# Patient Record
Sex: MALE | Race: WHITE | NOT HISPANIC OR LATINO
[De-identification: names, ages, dates, MRNs, and addresses within clinical notes are randomized per-mention and may not be internally consistent; named-entity substitution may affect disease eponyms.]

---

## 2021-06-18 PROBLEM — Z00.00 ENCOUNTER FOR PREVENTIVE HEALTH EXAMINATION: Status: ACTIVE | Noted: 2021-06-18

## 2021-07-02 ENCOUNTER — APPOINTMENT (OUTPATIENT)
Dept: OTOLARYNGOLOGY | Facility: CLINIC | Age: 71
End: 2021-07-02
Payer: MEDICARE

## 2021-07-02 VITALS
TEMPERATURE: 98.4 F | RESPIRATION RATE: 14 BRPM | HEIGHT: 69 IN | BODY MASS INDEX: 23.7 KG/M2 | DIASTOLIC BLOOD PRESSURE: 79 MMHG | WEIGHT: 160 LBS | HEART RATE: 78 BPM | SYSTOLIC BLOOD PRESSURE: 132 MMHG

## 2021-07-02 PROCEDURE — 99204 OFFICE O/P NEW MOD 45 MIN: CPT | Mod: 25

## 2021-07-02 PROCEDURE — 92567 TYMPANOMETRY: CPT

## 2021-07-02 PROCEDURE — 92557 COMPREHENSIVE HEARING TEST: CPT

## 2021-07-02 PROCEDURE — 92504 EAR MICROSCOPY EXAMINATION: CPT

## 2021-07-02 RX ORDER — IMATINIB MESYLATE 100 MG
CAPSULE ORAL
Refills: 0 | Status: ACTIVE | COMMUNITY

## 2021-07-03 NOTE — CONSULT LETTER
[Dear  ___] : Dear  [unfilled], [FreeTextEntry2] : Shoaib Davies MD [FreeTextEntry1] : Dear Shoaib,\par \par Thanks for referring Armando Goddard for evaluation of his right ear canal lesion.  As you know, he is a pleasant 70-year-old gentleman with a right ear canal lesion.  He develops pain with attempted ear cleaning.  He has a history of leukemia but is in remission.  On otoscopic exam today, the left ear canal and tympanic membrane appear normal.  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  Medially the tympanic membrane appears intact without effusion or retraction.  I personally ordered and reviewed an audiogram for the patient's hearing loss, which shows bilateral symmetric downsloping sensorineural hearing loss. I also personally reviewed the CAT scan images and the report, which show soft tissue opacification with minor bone erosion involving the anterior inferior bony ear canal.\par \par I very much agree that his clinical exam and CT findings appear consistent with right ear canal cholesteatoma.  He was unable to tolerate attempted in office cleaning today.  I am not also optimistic that in-office cleaning alone would lead to resolution of the cholesteatoma, and given the presence of exposed bone and debris accumulation, I recommended bony canalplasty under anesthesia for cholesteatoma removal.  He would prefer to continue observation in the short-term, and given these are generally slowly progressive abnormalities, it is reasonable to continue observation in the short-term.  I did however  him that cholesteatomas generally do not resolve on their own, and he is likely to require a procedure for durable resolution.  For now i will reexamine the ear in 3 months.\par \par Thank you once again for the opportunity to participate in your patient's care, and I will keep you informed as to his progress.\par \par Best regards,\par \par Kolton Batista MD\par Otology/Neurotology\par [facility]\par [facility]

## 2021-07-03 NOTE — CONSULT LETTER
[Dear  ___] : Dear  [unfilled], [FreeTextEntry2] : Shoaib Davies MD [FreeTextEntry1] : Dear Shoaib,\par \par Thanks for referring Armando Goddard for evaluation of his right ear canal lesion.  As you know, he is a pleasant 70-year-old gentleman with a right ear canal lesion.  He develops pain with attempted ear cleaning.  He has a history of leukemia but is in remission.  On otoscopic exam today, the left ear canal and tympanic membrane appear normal.  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  Medially the tympanic membrane appears intact without effusion or retraction.  I personally ordered and reviewed an audiogram for the patient's hearing loss, which shows bilateral symmetric downsloping sensorineural hearing loss. I also personally reviewed the CAT scan images and the report, which show soft tissue opacification with minor bone erosion involving the anterior inferior bony ear canal.\par \par I very much agree that his clinical exam and CT findings appear consistent with right ear canal cholesteatoma.  He was unable to tolerate attempted in office cleaning today.  I am not also optimistic that in-office cleaning alone would lead to resolution of the cholesteatoma, and given the presence of exposed bone and debris accumulation, I recommended bony canalplasty under anesthesia for cholesteatoma removal.  He would prefer to continue observation in the short-term, and given these are generally slowly progressive abnormalities, it is reasonable to continue observation in the short-term.  I did however  him that cholesteatomas generally do not resolve on their own, and he is likely to require a procedure for durable resolution.  For now i will reexamine the ear in 3 months.\par \par Thank you once again for the opportunity to participate in your patient's care, and I will keep you informed as to his progress.\par \par Best regards,\par \par Kolton Batista MD\par Otology/Neurotology\par [facility]\par [facility]

## 2021-07-23 ENCOUNTER — APPOINTMENT (OUTPATIENT)
Dept: ORTHOPEDIC SURGERY | Facility: CLINIC | Age: 71
End: 2021-07-23
Payer: MEDICARE

## 2021-07-23 VITALS
DIASTOLIC BLOOD PRESSURE: 74 MMHG | HEART RATE: 76 BPM | HEIGHT: 69 IN | WEIGHT: 160 LBS | SYSTOLIC BLOOD PRESSURE: 130 MMHG | BODY MASS INDEX: 23.7 KG/M2

## 2021-07-23 DIAGNOSIS — M77.8 OTHER ENTHESOPATHIES, NOT ELSEWHERE CLASSIFIED: ICD-10-CM

## 2021-07-23 DIAGNOSIS — M65.342 TRIGGER FINGER, LEFT RING FINGER: ICD-10-CM

## 2021-07-23 DIAGNOSIS — M65.331 TRIGGER FINGER, RIGHT MIDDLE FINGER: ICD-10-CM

## 2021-07-23 DIAGNOSIS — M65.352 TRIGGER FINGER, LEFT LITTLE FINGER: ICD-10-CM

## 2021-07-23 PROCEDURE — 99203 OFFICE O/P NEW LOW 30 MIN: CPT

## 2021-07-23 PROCEDURE — 73110 X-RAY EXAM OF WRIST: CPT | Mod: 50

## 2021-07-23 PROCEDURE — 73130 X-RAY EXAM OF HAND: CPT | Mod: 50

## 2021-07-23 RX ORDER — DOCUSATE SODIUM 100 MG/1
TABLET ORAL
Refills: 0 | Status: ACTIVE | COMMUNITY

## 2021-07-23 RX ORDER — PNV NO.95/FERROUS FUM/FOLIC AC 28MG-0.8MG
TABLET ORAL
Refills: 0 | Status: ACTIVE | COMMUNITY

## 2021-07-23 NOTE — DISCUSSION/SUMMARY
[FreeTextEntry1] : He has findings consistent with multiple hand chronic bilateral trigger fingers, most symptomatic at the left little and ring fingers.\par \par I had a discussion with the patient regarding today's visit, the prognosis of this diagnosis, and treatment recommendations and options. At this time, we discussed cortisone injections into the left little and ring fingers and details of surgical release. He deferred the cortisone injections today.  He would like to consider his options.  If he decides to proceed with cortisone injections or surgery, he will return to the office to discuss this further.\par \par He has agreed to the above plan of management and has expressed full understanding.  All questions were fully answered to their satisfaction. \par \par My cumulative time spent on this visit included: Preparation for the visit, review of the medical records, review of pertinent diagnostic studies, examination and counseling of the patient on the above diagnosis, treatment plan and prognosis, orders of diagnostic tests, medication and/or appropriate procedures and documentation in the medical records of today's visit.

## 2021-07-23 NOTE — ADDENDUM
[FreeTextEntry1] : I, Shanika Chapa, acted solely as a scribe for Dr. Jeff on this date on 07/23/2021.

## 2021-07-23 NOTE — HISTORY OF PRESENT ILLNESS
[Right] : right hand dominant [FreeTextEntry1] : He comes in today for evaluation of bilateral hand pain which began 17 years ago. The left hand is worse than the right hand. He denies injury of antecedent event. He denies numbness and tingling. He previously was treated by a doctor many years ago, but had no definitive diagnosis. He rates his right hand pain as a 10 out of 10 and he rates his left hand pain as a 10 out of 10.\par \par Patient has his own business, he works in security systems. He states as of next week he will be starting a new job.\par \par Of note, he has a history of Leukemia and prostate cancer.  He is on Gleevec for the leukemia.  He was treated with radiation for prostate cancer.

## 2021-07-23 NOTE — PHYSICAL EXAM
[de-identified] : - Constitutional: This is a male in no obvious distress.  \par - Psych: Patient is alert and oriented to person, place and time.  Patient has a normal mood and affect.\par - Cardiovascular: Normal pulses throughout the upper extremities.  No significant varicosities are noted in the upper extremities. \par - Neuro: Strength and sensation are intact throughout the upper extremities.  Patient has normal coordination.\par - Respiratory:  Patient exhibits no evidence of shortness of breath or difficulty breathing.\par - Skin: No rashes, lesions, or other abnormalities are noted in the upper extremities.\par \par --- \par \par Examination of his left hand demonstrates no focal swelling.  He has limitation of flexion and extension of the digits.  He has tenderness along the A1 pulleys of the left little and ring fingers with triggering.  He has tenderness along the left middle and index fingers without obvious triggering.  There is mild tenderness along the A1 pulley of the left thumb without triggering.  Provocative signs for carpal tunnel syndrome are negative.  He is neurovascularly intact distally.\par \par Examination of right hand demonstrates no obvious swelling.  He has mild tenderness along the A1 pulleys of the index or little fingers with triggering of the right middle finger.  There is no triggering of the other digits.  There is no triggering of the thumb.  Provocative signs for carpal tunnel syndrome are negative.  He is neurovascularly intact distally. [de-identified] : PA, lateral, and oblique radiographs of the bilateral wrists and hands demonstrate minimal narrowing of both thumb CMC joints.  There is no evidence of appreciable arthritis in the digits.

## 2021-07-23 NOTE — END OF VISIT
[FreeTextEntry3] : This note was written by Shanika Chapa on 07/23/2021 acting solely as a scribe for Dr. Julio Cesar Jeff.\par  \par All medical record entries made by the Scribe were at my, Dr. Julio Cesar Jeff, direction and personally dictated by me on 07/23/2021. I have personally reviewed the chart and agree that the record accurately reflects my personal performance of the history, physical exam, assessment and plan.

## 2021-08-20 ENCOUNTER — APPOINTMENT (OUTPATIENT)
Dept: OTOLARYNGOLOGY | Facility: CLINIC | Age: 71
End: 2021-08-20

## 2021-09-27 NOTE — DATA REVIEWED
[de-identified] : Hearing WNL sloping to a severe high frequency SNHL bilaterally.\par Type A tympanograms bilaterally.

## 2021-09-27 NOTE — PHYSICAL EXAM
[Normal] : external ears are normal bilaterally [de-identified] : AD: canal cholesteatoma, AS: cerumen

## 2021-09-27 NOTE — PHYSICAL EXAM
[Normal] : external ears are normal bilaterally [de-identified] : AD: canal cholesteatoma, AS: cerumen

## 2021-09-27 NOTE — DATA REVIEWED
[de-identified] : Hearing WNL sloping to a severe high frequency SNHL bilaterally.\par Type A tympanograms bilaterally.

## 2021-09-27 NOTE — PROCEDURE
[FreeTextEntry3] : Procedure note:  Left cerumenectomy\par \par Jul 02, 2021 \par \par Description of Procedure:  Cerumen impaction was noted requiring debridement under the operating microscope using otologic instrumentation.  The patient tolerated the procedure without complications.\par \par

## 2021-09-27 NOTE — HISTORY OF PRESENT ILLNESS
[de-identified] : 70M referred by Dr. Davies for Right ear canal lesion, and otalgia\par CT done at [facility]\par denies discharge today\par Hx of three right ear infections in the last four years\par treated with abx, had resolved, now with lesion\par no ear infections or surgery previous to this \par denies perceived changes in his hearing\par denies headaches or dizziness

## 2021-09-27 NOTE — HISTORY OF PRESENT ILLNESS
[de-identified] : 70M referred by Dr. Davies for Right ear canal lesion, and otalgia\par CT done at [facility]\par denies discharge today\par Hx of three right ear infections in the last four years\par treated with abx, had resolved, now with lesion\par no ear infections or surgery previous to this \par denies perceived changes in his hearing\par denies headaches or dizziness

## 2021-10-07 ENCOUNTER — APPOINTMENT (OUTPATIENT)
Dept: OTOLARYNGOLOGY | Facility: CLINIC | Age: 71
End: 2021-10-07
Payer: MEDICARE

## 2021-10-07 VITALS
BODY MASS INDEX: 23.7 KG/M2 | SYSTOLIC BLOOD PRESSURE: 144 MMHG | HEIGHT: 69 IN | DIASTOLIC BLOOD PRESSURE: 83 MMHG | WEIGHT: 160 LBS | HEART RATE: 73 BPM

## 2021-10-07 PROCEDURE — 69210 REMOVE IMPACTED EAR WAX UNI: CPT

## 2021-10-07 PROCEDURE — 99214 OFFICE O/P EST MOD 30 MIN: CPT | Mod: 25

## 2021-10-07 RX ORDER — PANTOPRAZOLE SODIUM 40 MG/1
40 TABLET, DELAYED RELEASE ORAL
Refills: 0 | Status: DISCONTINUED | COMMUNITY
End: 2021-10-07

## 2021-10-07 RX ORDER — ESOMEPRAZOLE MAGNESIUM 40 MG/1
CAPSULE, DELAYED RELEASE ORAL
Refills: 0 | Status: ACTIVE | COMMUNITY

## 2021-10-07 NOTE — HISTORY OF PRESENT ILLNESS
[de-identified] : 70 year male follow-up for right ear lesion\par One month ago experienced left ear otalgia that lasted for two or three days and went away naturally\par Patient currently denies otalgia, otorrhea, ear infections since the last visit, changes to hearing, tinnitus, dizziness, headaches related to hearing.

## 2021-10-07 NOTE — REASON FOR VISIT
[Subsequent Evaluation] : a subsequent evaluation for [FreeTextEntry2] : follow-up for right ear lesion.

## 2021-10-07 NOTE — CONSULT LETTER
[FreeTextEntry2] : Shoaib Daives MD [FreeTextEntry1] : Dear Shoaib,\par \par Armando Goddard presents for follow-up for his right ear canal cholesteatoma.  Since his last visit, he reports no new ear pain or drainage.  Exam today again shows ceruminous debris accumulation in the inferior right ear canal consistent with an ear canal cholesteatoma.  He once again was unable to tolerate in office cleaning aside from removal of superficial wax accumulation in either ear.  Because of his inability to tolerate in office cleaning and the persistence of the bony canal abnormality, I again recommended exam under anesthesia and cholesteatoma removal.  I would also plan to perform limited bony canalplasty through a transcanal approach should this be necessary, and I discussed this with him and his family member in the office today.  He agrees to proceed and this will be set up in the next 1 to 2 months for scheduling.\par \par Thank you once again for the opportunity to participate in your patient's care, and I will keep you informed as to his progress.\par \par Best regards,\par \par Kolton Batista MD\par Otology/Neurotology\par [facility]\par [facility]

## 2022-04-16 ENCOUNTER — TRANSCRIPTION ENCOUNTER (OUTPATIENT)
Age: 72
End: 2022-04-16

## 2022-04-20 ENCOUNTER — OUTPATIENT (OUTPATIENT)
Dept: OUTPATIENT SERVICES | Facility: HOSPITAL | Age: 72
LOS: 1 days | End: 2022-04-20
Payer: MEDICARE

## 2022-04-20 VITALS
SYSTOLIC BLOOD PRESSURE: 144 MMHG | DIASTOLIC BLOOD PRESSURE: 78 MMHG | HEIGHT: 69 IN | OXYGEN SATURATION: 99 % | WEIGHT: 160.06 LBS | RESPIRATION RATE: 16 BRPM | TEMPERATURE: 97 F | HEART RATE: 78 BPM

## 2022-04-20 DIAGNOSIS — H60.41 CHOLESTEATOMA OF RIGHT EXTERNAL EAR: ICD-10-CM

## 2022-04-20 DIAGNOSIS — H90.41 SENSORINEURAL HEARING LOSS, UNILATERAL, RIGHT EAR, WITH UNRESTRICTED HEARING ON THE CONTRALATERAL SIDE: ICD-10-CM

## 2022-04-20 DIAGNOSIS — Z98.890 OTHER SPECIFIED POSTPROCEDURAL STATES: Chronic | ICD-10-CM

## 2022-04-20 DIAGNOSIS — K21.9 GASTRO-ESOPHAGEAL REFLUX DISEASE WITHOUT ESOPHAGITIS: Chronic | ICD-10-CM

## 2022-04-20 DIAGNOSIS — H90.5 UNSPECIFIED SENSORINEURAL HEARING LOSS: ICD-10-CM

## 2022-04-20 DIAGNOSIS — H93.293 OTHER ABNORMAL AUDITORY PERCEPTIONS, BILATERAL: ICD-10-CM

## 2022-04-20 DIAGNOSIS — Z87.19 PERSONAL HISTORY OF OTHER DISEASES OF THE DIGESTIVE SYSTEM: Chronic | ICD-10-CM

## 2022-04-20 LAB
ALBUMIN SERPL ELPH-MCNC: 4.7 G/DL — SIGNIFICANT CHANGE UP (ref 3.3–5)
ALP SERPL-CCNC: 80 U/L — SIGNIFICANT CHANGE UP (ref 40–120)
ALT FLD-CCNC: 14 U/L — SIGNIFICANT CHANGE UP (ref 4–41)
ANION GAP SERPL CALC-SCNC: 13 MMOL/L — SIGNIFICANT CHANGE UP (ref 7–14)
AST SERPL-CCNC: 29 U/L — SIGNIFICANT CHANGE UP (ref 4–40)
BILIRUB SERPL-MCNC: 0.3 MG/DL — SIGNIFICANT CHANGE UP (ref 0.2–1.2)
BUN SERPL-MCNC: 19 MG/DL — SIGNIFICANT CHANGE UP (ref 7–23)
CALCIUM SERPL-MCNC: 9.3 MG/DL — SIGNIFICANT CHANGE UP (ref 8.4–10.5)
CHLORIDE SERPL-SCNC: 101 MMOL/L — SIGNIFICANT CHANGE UP (ref 98–107)
CO2 SERPL-SCNC: 24 MMOL/L — SIGNIFICANT CHANGE UP (ref 22–31)
CREAT SERPL-MCNC: 1.37 MG/DL — HIGH (ref 0.5–1.3)
EGFR: 56 ML/MIN/1.73M2 — LOW
GLUCOSE SERPL-MCNC: 106 MG/DL — HIGH (ref 70–99)
HCT VFR BLD CALC: 38.9 % — LOW (ref 39–50)
HGB BLD-MCNC: 12.5 G/DL — LOW (ref 13–17)
MCHC RBC-ENTMCNC: 30.6 PG — SIGNIFICANT CHANGE UP (ref 27–34)
MCHC RBC-ENTMCNC: 32.1 GM/DL — SIGNIFICANT CHANGE UP (ref 32–36)
MCV RBC AUTO: 95.1 FL — SIGNIFICANT CHANGE UP (ref 80–100)
NRBC # BLD: 0 /100 WBCS — SIGNIFICANT CHANGE UP
NRBC # FLD: 0 K/UL — SIGNIFICANT CHANGE UP
PLATELET # BLD AUTO: 238 K/UL — SIGNIFICANT CHANGE UP (ref 150–400)
POTASSIUM SERPL-MCNC: 4.4 MMOL/L — SIGNIFICANT CHANGE UP (ref 3.5–5.3)
POTASSIUM SERPL-SCNC: 4.4 MMOL/L — SIGNIFICANT CHANGE UP (ref 3.5–5.3)
PROT SERPL-MCNC: 7.3 G/DL — SIGNIFICANT CHANGE UP (ref 6–8.3)
RBC # BLD: 4.09 M/UL — LOW (ref 4.2–5.8)
RBC # FLD: 13.5 % — SIGNIFICANT CHANGE UP (ref 10.3–14.5)
SODIUM SERPL-SCNC: 138 MMOL/L — SIGNIFICANT CHANGE UP (ref 135–145)
WBC # BLD: 6.77 K/UL — SIGNIFICANT CHANGE UP (ref 3.8–10.5)
WBC # FLD AUTO: 6.77 K/UL — SIGNIFICANT CHANGE UP (ref 3.8–10.5)

## 2022-04-20 PROCEDURE — 93010 ELECTROCARDIOGRAM REPORT: CPT

## 2022-04-20 NOTE — H&P PST ADULT - WEIGHT IN KG
What Type Of Note Output Would You Prefer (Optional)?: Standard Output How Severe Are Your Spot(S)?: mild Have Your Spot(S) Been Treated In The Past?: has not been treated Hpi Title: Evaluation of Skin Lesions 72.6

## 2022-04-20 NOTE — H&P PST ADULT - PROBLEM SELECTOR PLAN 1
Patient tentatively scheduled for  right ear canal biopsy ,right tympanoplasty ,fat/fascia graft on 04/26/2022  Pre-op instructions provided. Pt given verbal and written instructions with teach back on pantoprazole . Pt verbalized understanding with return demonstration.   Pt strongly advised to follow up with surgeon to discuss COVID testing requirements prior to procedure.

## 2022-04-20 NOTE — H&P PST ADULT - ASSESSMENT
pre op dx of other abnormal auditory perceptions bilateral is scheduled for right ear canal biopsy ,right tympanoplasty ,fat/fascia graft   Other abnormal auditory perceptions bilateral

## 2022-04-20 NOTE — H&P PST ADULT - NSICDXPASTSURGICALHX_GEN_ALL_CORE_FT
PAST SURGICAL HISTORY:  GERD (gastroesophageal reflux disease)     H/O constipation     H/O hernia repair 2019    H/O rotator cuff surgery right

## 2022-04-25 ENCOUNTER — TRANSCRIPTION ENCOUNTER (OUTPATIENT)
Age: 72
End: 2022-04-25

## 2022-04-26 ENCOUNTER — APPOINTMENT (OUTPATIENT)
Dept: OTOLARYNGOLOGY | Facility: AMBULATORY SURGERY CENTER | Age: 72
End: 2022-04-26

## 2022-04-26 ENCOUNTER — OUTPATIENT (OUTPATIENT)
Dept: OUTPATIENT SERVICES | Facility: HOSPITAL | Age: 72
LOS: 1 days | Discharge: HOME | End: 2022-04-26
Payer: MEDICARE

## 2022-04-26 ENCOUNTER — TRANSCRIPTION ENCOUNTER (OUTPATIENT)
Age: 72
End: 2022-04-26

## 2022-04-26 ENCOUNTER — RESULT REVIEW (OUTPATIENT)
Age: 72
End: 2022-04-26

## 2022-04-26 VITALS
DIASTOLIC BLOOD PRESSURE: 70 MMHG | WEIGHT: 160.06 LBS | HEART RATE: 75 BPM | TEMPERATURE: 98 F | SYSTOLIC BLOOD PRESSURE: 180 MMHG | OXYGEN SATURATION: 99 % | RESPIRATION RATE: 16 BRPM | HEIGHT: 69 IN

## 2022-04-26 VITALS
HEART RATE: 79 BPM | RESPIRATION RATE: 18 BRPM | DIASTOLIC BLOOD PRESSURE: 70 MMHG | OXYGEN SATURATION: 96 % | SYSTOLIC BLOOD PRESSURE: 127 MMHG

## 2022-04-26 DIAGNOSIS — K21.9 GASTRO-ESOPHAGEAL REFLUX DISEASE WITHOUT ESOPHAGITIS: Chronic | ICD-10-CM

## 2022-04-26 DIAGNOSIS — Z87.19 PERSONAL HISTORY OF OTHER DISEASES OF THE DIGESTIVE SYSTEM: Chronic | ICD-10-CM

## 2022-04-26 DIAGNOSIS — H60.41 CHOLESTEATOMA OF RIGHT EXTERNAL EAR: ICD-10-CM

## 2022-04-26 DIAGNOSIS — Z98.890 OTHER SPECIFIED POSTPROCEDURAL STATES: Chronic | ICD-10-CM

## 2022-04-26 PROBLEM — C61 MALIGNANT NEOPLASM OF PROSTATE: Chronic | Status: ACTIVE | Noted: 2022-04-20

## 2022-04-26 PROBLEM — C95.90 LEUKEMIA, UNSPECIFIED NOT HAVING ACHIEVED REMISSION: Chronic | Status: ACTIVE | Noted: 2022-04-20

## 2022-04-26 PROCEDURE — 69145 REMOVE EAR CANAL LESION(S): CPT

## 2022-04-26 PROCEDURE — 88305 TISSUE EXAM BY PATHOLOGIST: CPT | Mod: 26

## 2022-04-26 DEVICE — SURGIFOAM PAD 8CM X 12.5CM X 2MM (100C): Type: IMPLANTABLE DEVICE | Status: FUNCTIONAL

## 2022-04-26 RX ORDER — IMATINIB MESYLATE 400 MG/1
1 TABLET, FILM COATED ORAL
Qty: 0 | Refills: 0 | DISCHARGE

## 2022-04-26 RX ORDER — PREGABALIN 225 MG/1
1 CAPSULE ORAL
Qty: 0 | Refills: 0 | DISCHARGE

## 2022-04-26 RX ORDER — ACETAMINOPHEN 500 MG
2 TABLET ORAL
Qty: 60 | Refills: 0
Start: 2022-04-26

## 2022-04-26 RX ORDER — PANTOPRAZOLE SODIUM 20 MG/1
1 TABLET, DELAYED RELEASE ORAL
Qty: 0 | Refills: 0 | DISCHARGE

## 2022-04-26 RX ORDER — DOCUSATE SODIUM 100 MG
1 CAPSULE ORAL
Qty: 0 | Refills: 0 | DISCHARGE

## 2022-04-26 RX ORDER — CIPROFLOXACIN AND DEXAMETHASONE 3; 1 MG/ML; MG/ML
2 SUSPENSION/ DROPS AURICULAR (OTIC)
Qty: 7.5 | Refills: 0
Start: 2022-04-26

## 2022-04-26 NOTE — ASU DISCHARGE PLAN (ADULT/PEDIATRIC) - ASU DC SPECIAL INSTRUCTIONSFT
refer to postop ear surgery instructions handout -ciprodex drops to right ear 2 time a day until follow up    -should expect minimal pain. You may take tylenol every 4-6 hours as needed    -continue following dry ear precaution, especially when showering or bathing. Place a cotton ball coated with vaseline on it into the ear to protect the inside of the right ear getting wet

## 2022-04-26 NOTE — ASU DISCHARGE PLAN (ADULT/PEDIATRIC) - CARE PROVIDER_API CALL
Reji Batista)  Neurotology; Otolaryngology  [address] [place] [address]  Phone: [phone]  Fax: [phone]  Follow Up Time:

## 2022-04-26 NOTE — ASU DISCHARGE PLAN (ADULT/PEDIATRIC) - PROVIDER TOKENS
Quality 130: Documentation Of Current Medications In The Medical Record: Current Medications Documented
Detail Level: Detailed
PROVIDER:[TOKEN:[[id number]]]

## 2022-04-26 NOTE — BRIEF OPERATIVE NOTE - OPERATION/FINDINGS
s/p transcanal canalplasty, right external ear canal cholesteatoma removal s/p transcanal right external ear canal cholesteatoma removal

## 2022-05-05 LAB — SURGICAL PATHOLOGY STUDY: SIGNIFICANT CHANGE UP

## 2022-05-12 ENCOUNTER — APPOINTMENT (OUTPATIENT)
Dept: OTOLARYNGOLOGY | Facility: CLINIC | Age: 72
End: 2022-05-12
Payer: MEDICARE

## 2022-05-12 VITALS
BODY MASS INDEX: 23.7 KG/M2 | DIASTOLIC BLOOD PRESSURE: 84 MMHG | SYSTOLIC BLOOD PRESSURE: 144 MMHG | HEIGHT: 69 IN | WEIGHT: 160 LBS | HEART RATE: 76 BPM

## 2022-05-12 PROCEDURE — 99024 POSTOP FOLLOW-UP VISIT: CPT

## 2022-05-12 NOTE — HISTORY OF PRESENT ILLNESS
[de-identified] : 71 year old male presents for follow up Excision of right external auditory canal soft tissue lesion/cholesteatoma,  use of operative microscope 4/26/22. States doing well after surgery has no complains. Denies fevers, bleeding, otorrhea or otalgia. \par \par Pathology 4/26/22\par Final Diagnosis\par 1. Ear, canal mass, right, excision\par -  Keratin debris consistent with cholesteatoma\par 2. Ear, external auditory canal, right, biopsy

## 2022-05-12 NOTE — REASON FOR VISIT
[Subsequent Evaluation] : a subsequent evaluation for [FreeTextEntry2] : s/p Excision of right external auditory canal soft tissue lesion/cholesteatoma, use of operative microscope  4/26/22

## 2022-05-12 NOTE — CONSULT LETTER
[FreeTextEntry2] : Shoaib Davies MD [FreeTextEntry1] : Dear Shoaib,\par \par Armando Goddard presents for follow-up for his right ear canal cholesteatoma removal.  He is now 2 weeks status post canalplasty.  At the time of the procedure, we found a small focus of necrotic bone combined with cholesteatoma debris in the ear canal that was removed in entirety through transcanal approach.  Since surgery he has been doing well without significant pain, drainage or dizziness.  Exam today shows minor dry debris along the anterior ear canal but no evidence of recurrent cholesteatoma.  I asked that he continue drops for another week, maintain dry ear precautions, and we will see him back in 6 weeks.\par \par Thank you once again for the opportunity to participate in your patient's care, and I will keep you informed as to his progress.\par \par Best regards,\par \par Kolton Batista MD\par Otology/Neurotology\par [facility]\par [facility]

## 2022-05-12 NOTE — ASSESSMENT
[FreeTextEntry1] : Armando Goddard presents for follow-up for his right ear canal cholesteatoma removal.  He is now 2 weeks status post canalplasty.  At the time of the procedure, we found a small focus of necrotic bone combined with cholesteatoma debris in the ear canal that was removed in entirety through transcanal approach.  Since surgery he has been doing well without significant pain, drainage or dizziness.  Exam today shows minor dry debris along the anterior ear canal but no evidence of recurrent cholesteatoma.  Bilateral facial nerve function is normal, and the right tympanic membrane is intact without effusion or retraction.  I asked that he continue drops for another week, maintain dry ear precautions, and we will see him back in 6 weeks.

## 2022-06-22 ENCOUNTER — APPOINTMENT (OUTPATIENT)
Dept: OTOLARYNGOLOGY | Facility: CLINIC | Age: 72
End: 2022-06-22
Payer: MEDICARE

## 2022-06-22 VITALS
HEART RATE: 67 BPM | DIASTOLIC BLOOD PRESSURE: 83 MMHG | WEIGHT: 160 LBS | HEIGHT: 69 IN | TEMPERATURE: 97.6 F | BODY MASS INDEX: 23.7 KG/M2 | SYSTOLIC BLOOD PRESSURE: 131 MMHG

## 2022-06-22 PROCEDURE — 99024 POSTOP FOLLOW-UP VISIT: CPT

## 2022-06-22 NOTE — ASSESSMENT
[FreeTextEntry1] : Presents for second postoperative visit status post right EAC cholesteatoma removal.  Exam today shows well-healed right ear canal epithelium without exposed bone or recurrent cholesteatoma.  Cerumen removed from left ear.  Maintain dry ear precautions, follow-up 3 months.

## 2022-06-22 NOTE — HISTORY OF PRESENT ILLNESS
[de-identified] : 70 y/o M presents for post operative visit. S/P excision of right external auditory canal cholesteatoma 4/26/22. Reports chronic tinnitus; no changes since last visit. Denies any post surgical complications. Patient denies otalgia, otorrhea, hearing loss, dizziness, vertigo, headaches related to hearing.

## 2022-12-21 ENCOUNTER — APPOINTMENT (OUTPATIENT)
Dept: OTOLARYNGOLOGY | Facility: CLINIC | Age: 72
End: 2022-12-21

## 2022-12-21 DIAGNOSIS — H60.41 CHOLESTEATOMA OF RIGHT EXTERNAL EAR: ICD-10-CM

## 2022-12-21 PROCEDURE — 99213 OFFICE O/P EST LOW 20 MIN: CPT | Mod: 25

## 2022-12-21 PROCEDURE — 69210 REMOVE IMPACTED EAR WAX UNI: CPT

## 2022-12-21 NOTE — PROCEDURE
[FreeTextEntry3] : Procedure note:  Bilateral cerumenectomy\par \par Dec 21, 2022 \par \par Description of Procedure:   Bilateral cerumen impactions were noted requiring debridement under the operating microscope using otologic instrumentation.  The patient tolerated the procedure without complications.

## 2022-12-21 NOTE — ASSESSMENT
[FreeTextEntry1] : Patient presents for follow-up for right ear canal cholesteatoma.  Reports no pain or drainage since last visit.  Reports continued improved hearing since last visit.  Otoscopic exam today shows well-healed right ear canal epithelium without recurrent cholesteatoma.  Minor cerumen accumulation removed bilaterally.\par \par No longer needs to maintain dry ear precautions.  Follow-up annually to monitor hearing, avoid Q-tip usage.

## 2022-12-21 NOTE — PHYSICAL EXAM
[Binocular Microscopic Exam] : Binocular microscopic exam was performed [Normal] : the left external ear was normal [de-identified] : wax accumulation; skin healed anterior canal wall no sign of regrowth [de-identified] : wax accumulation

## 2022-12-21 NOTE — HISTORY OF PRESENT ILLNESS
[de-identified] : 71 y/o M presents for follow-up\par s/p right EAC cholesteatoma removal 4/26/22\par denies any new issues with pain or drainage

## 2022-12-30 ENCOUNTER — APPOINTMENT (OUTPATIENT)
Dept: OTOLARYNGOLOGY | Facility: CLINIC | Age: 72
End: 2022-12-30

## 2023-04-15 NOTE — H&P PST ADULT - HISTORY OF PRESENT ILLNESS
15-Apr-2023 22:57
71 year old male with pre op dx of other abnormal auditory perceptions bilateral is scheduled for right ear canal biopsy ,right tympanoplasty ,fat/fascia graft

## 2023-11-09 NOTE — REASON FOR VISIT
[Initial Consultation] : an initial consultation for [FreeTextEntry2] : right ear lesion Dressing: no dressing applied

## 2023-12-15 ENCOUNTER — APPOINTMENT (OUTPATIENT)
Dept: OTOLARYNGOLOGY | Facility: CLINIC | Age: 73
End: 2023-12-15
Payer: MEDICARE

## 2023-12-15 DIAGNOSIS — H93.293 OTHER ABNORMAL AUDITORY PERCEPTIONS, BILATERAL: ICD-10-CM

## 2023-12-15 DIAGNOSIS — H61.23 IMPACTED CERUMEN, BILATERAL: ICD-10-CM

## 2023-12-15 DIAGNOSIS — H90.5 UNSPECIFIED SENSORINEURAL HEARING LOSS: ICD-10-CM

## 2023-12-15 PROCEDURE — 92557 COMPREHENSIVE HEARING TEST: CPT

## 2023-12-15 PROCEDURE — 92567 TYMPANOMETRY: CPT

## 2023-12-15 PROCEDURE — 99213 OFFICE O/P EST LOW 20 MIN: CPT | Mod: 25

## 2023-12-15 PROCEDURE — 69210 REMOVE IMPACTED EAR WAX UNI: CPT

## 2023-12-15 NOTE — PROCEDURE
[FreeTextEntry3] : Procedure note:  Bilateral cerumenectomy  Description of Procedure:  Cerumen impaction was noted requiring debridement under the operating microscope using otologic instrumentation.  This procedure was repeated in the contralateral ear.  The patient tolerated the procedure without complications.

## 2023-12-15 NOTE — HISTORY OF PRESENT ILLNESS
[de-identified] : 73 year old male with history of right cholesteatoma. s/p right EAC cholesteatoma removal 4/26/22. Presents today for follow up evaluation. denies any new issues with pain or drainage since last visit denies changes in hearing since alst visit

## 2023-12-15 NOTE — ASSESSMENT
[FreeTextEntry1] : Exam today shows no evidence of recurrent right ear collateral cholesteatoma, cerumen removed bilaterally, bilateral tympanic membranes normal.  I personally ordered and reviewed an audiogram for the patient's abnormal auditory perception, which shows bilateral mostly stable high-frequency sensorineural hearing loss.  Patient does not feel as though he currently needs hearing aids, although I did  him that he is a candidate for them.  Follow-up annually for ear cleaning and to monitor for recurrent cholesteatoma.

## 2025-04-11 ENCOUNTER — EMERGENCY (EMERGENCY)
Facility: HOSPITAL | Age: 75
LOS: 0 days | Discharge: HOME | End: 2025-04-12
Attending: STUDENT IN AN ORGANIZED HEALTH CARE EDUCATION/TRAINING PROGRAM
Payer: MEDICARE

## 2025-04-11 VITALS
DIASTOLIC BLOOD PRESSURE: 91 MMHG | HEART RATE: 82 BPM | TEMPERATURE: 98 F | SYSTOLIC BLOOD PRESSURE: 162 MMHG | OXYGEN SATURATION: 98 % | HEIGHT: 69 IN | RESPIRATION RATE: 20 BRPM | WEIGHT: 160.06 LBS

## 2025-04-11 DIAGNOSIS — T15.02XA FOREIGN BODY IN CORNEA, LEFT EYE, INITIAL ENCOUNTER: ICD-10-CM

## 2025-04-11 DIAGNOSIS — Z87.19 PERSONAL HISTORY OF OTHER DISEASES OF THE DIGESTIVE SYSTEM: Chronic | ICD-10-CM

## 2025-04-11 DIAGNOSIS — Z23 ENCOUNTER FOR IMMUNIZATION: ICD-10-CM

## 2025-04-11 DIAGNOSIS — Z98.890 OTHER SPECIFIED POSTPROCEDURAL STATES: Chronic | ICD-10-CM

## 2025-04-11 DIAGNOSIS — C92.10 CHRONIC MYELOID LEUKEMIA, BCR/ABL-POSITIVE, NOT HAVING ACHIEVED REMISSION: ICD-10-CM
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
## 2025-04-11 DIAGNOSIS — K21.9 GASTRO-ESOPHAGEAL REFLUX DISEASE WITHOUT ESOPHAGITIS: Chronic | ICD-10-CM

## 2025-04-11 DIAGNOSIS — C61 MALIGNANT NEOPLASM OF PROSTATE: ICD-10-CM
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
## 2025-04-11 PROCEDURE — 99284 EMERGENCY DEPT VISIT MOD MDM: CPT | Mod: 25

## 2025-04-11 PROCEDURE — 65220 REMOVE FOREIGN BODY FROM EYE: CPT | Mod: LT

## 2025-04-11 RX ORDER — OFLOXACIN 3 MG/ML
2 SOLUTION OPHTHALMIC ONCE
Refills: 0 | Status: COMPLETED | OUTPATIENT
Start: 2025-04-11 | End: 2025-04-11

## 2025-04-11 RX ORDER — TETRACAINE HYDROCHLORIDE 5 MG/ML
1 SOLUTION OPHTHALMIC ONCE
Refills: 0 | Status: COMPLETED | OUTPATIENT
Start: 2025-04-11 | End: 2025-04-11

## 2025-04-11 RX ORDER — ERYTHROMYCIN 5 MG/G
1 OINTMENT OPHTHALMIC ONCE
Refills: 0 | Status: COMPLETED | OUTPATIENT
Start: 2025-04-11 | End: 2025-04-11

## 2025-04-11 RX ADMIN — TETRACAINE HYDROCHLORIDE 1 DROP(S): 5 SOLUTION OPHTHALMIC at 22:57

## 2025-04-12 VITALS
DIASTOLIC BLOOD PRESSURE: 83 MMHG | OXYGEN SATURATION: 97 % | RESPIRATION RATE: 20 BRPM | SYSTOLIC BLOOD PRESSURE: 138 MMHG | HEART RATE: 71 BPM | TEMPERATURE: 98 F

## 2025-04-12 RX ADMIN — ERYTHROMYCIN 1 APPLICATION(S): 5 OINTMENT OPHTHALMIC at 00:00

## 2025-04-12 RX ADMIN — OFLOXACIN 2 DROP(S): 3 SOLUTION OPHTHALMIC at 00:00

## 2025-05-21 ENCOUNTER — APPOINTMENT (OUTPATIENT)
Dept: OTOLARYNGOLOGY | Facility: CLINIC | Age: 75
End: 2025-05-21

## 2025-05-21 VITALS
SYSTOLIC BLOOD PRESSURE: 152 MMHG | HEIGHT: 69 IN | BODY MASS INDEX: 24.44 KG/M2 | WEIGHT: 165 LBS | OXYGEN SATURATION: 98 % | RESPIRATION RATE: 17 BRPM | DIASTOLIC BLOOD PRESSURE: 82 MMHG | HEART RATE: 69 BPM

## 2025-05-21 DIAGNOSIS — H60.41 CHOLESTEATOMA OF RIGHT EXTERNAL EAR: ICD-10-CM

## 2025-05-21 DIAGNOSIS — H90.5 UNSPECIFIED SENSORINEURAL HEARING LOSS: ICD-10-CM

## 2025-05-21 DIAGNOSIS — H61.23 IMPACTED CERUMEN, BILATERAL: ICD-10-CM

## 2025-05-21 DIAGNOSIS — H93.293 OTHER ABNORMAL AUDITORY PERCEPTIONS, BILATERAL: ICD-10-CM

## 2025-05-21 PROCEDURE — 99213 OFFICE O/P EST LOW 20 MIN: CPT | Mod: 25

## 2025-05-21 PROCEDURE — 69210 REMOVE IMPACTED EAR WAX UNI: CPT

## (undated) DEVICE — DRAPE MAYO STAND 30"

## (undated) DEVICE — DRAPE INSTRUMENT POUCH 6.75" X 11"

## (undated) DEVICE — TUBING IRRIGATION HF NAVIO

## (undated) DEVICE — SOL IRR POUR H2O 500ML

## (undated) DEVICE — DRAPE MICROSCOPE OPMI VISIONGUARD 48X118"

## (undated) DEVICE — FOLEY CATH 2-WAY 22FR 5CC UNCOATED SILICONE

## (undated) DEVICE — CAM-ESU FORCE FX VALLEYLAB CAUTERY 019523: Type: DURABLE MEDICAL EQUIPMENT

## (undated) DEVICE — ELCTR BOVIE TIP BLADE INSULATED 2.75" EDGE

## (undated) DEVICE — DRSG PELLET

## (undated) DEVICE — DRAIN PENROSE .5" X 18" LATEX

## (undated) DEVICE — PACK NASAL

## (undated) DEVICE — COTTONBALL LG

## (undated) DEVICE — DRSG TELFA 3 X 8

## (undated) DEVICE — DRILL BIT ANSPACH DIAMOND BALL 1MMX5CM

## (undated) DEVICE — MARKING PEN W RULER

## (undated) DEVICE — GLV 7 PROTEXIS (WHITE)

## (undated) DEVICE — CANISTER DISPOSABLE THIN WALL 3000CC

## (undated) DEVICE — DRAPE TOWEL BLUE 17" X 24"

## (undated) DEVICE — CONN FEMALE LUER ADAPTOR SM XMAS TREE

## (undated) DEVICE — DRAPE MICROSCOPE ZEISS W CLEARLENS

## (undated) DEVICE — ELCTR NDL SUBDERMAL 2 CHANNEL

## (undated) DEVICE — WARMING BLANKET FULL ADULT

## (undated) DEVICE — DRAPE SPLIT SHEET 77" X 120"

## (undated) DEVICE — SUT CHROMIC 3-0 27" RB-1

## (undated) DEVICE — LABELS BLANK W PEN

## (undated) DEVICE — SUT MONOCRYL 4-0 18" P-3 UNDYED

## (undated) DEVICE — DRSG MASTISOL

## (undated) DEVICE — DRSG STERISTRIPS 0.5 X 4"

## (undated) DEVICE — CLIP IRRIGATIION FOR QD8/QD5S ANGLE ATTACHMENT

## (undated) DEVICE — ELCTR MONOPOLAR STIMULATOR PROBE FLUSH-TIP

## (undated) DEVICE — VENODYNE/SCD SLEEVE CALF MEDIUM

## (undated) DEVICE — SUT PLAIN GUT FAST ABSORBING 5-0 PC-1

## (undated) DEVICE — ELCTR GROUNDING PAD ADULT COVIDIEN